# Patient Record
Sex: FEMALE | Race: WHITE | NOT HISPANIC OR LATINO | Employment: UNEMPLOYED | URBAN - NONMETROPOLITAN AREA
[De-identification: names, ages, dates, MRNs, and addresses within clinical notes are randomized per-mention and may not be internally consistent; named-entity substitution may affect disease eponyms.]

---

## 2023-01-13 ENCOUNTER — TRANSCRIBE ORDERS (OUTPATIENT)
Dept: MAMMOGRAPHY | Facility: CLINIC | Age: 58
End: 2023-01-13
Payer: COMMERCIAL

## 2023-01-13 DIAGNOSIS — R05.9 COUGH, UNSPECIFIED TYPE: Primary | ICD-10-CM

## 2023-03-06 ENCOUNTER — OFFICE VISIT (OUTPATIENT)
Dept: PULMONOLOGY | Facility: CLINIC | Age: 58
End: 2023-03-06
Payer: COMMERCIAL

## 2023-03-06 VITALS
HEIGHT: 65 IN | HEART RATE: 80 BPM | RESPIRATION RATE: 14 BRPM | WEIGHT: 132.8 LBS | DIASTOLIC BLOOD PRESSURE: 78 MMHG | TEMPERATURE: 98.6 F | OXYGEN SATURATION: 99 % | SYSTOLIC BLOOD PRESSURE: 102 MMHG | BODY MASS INDEX: 22.13 KG/M2

## 2023-03-06 DIAGNOSIS — R05.3 CHRONIC COUGH: Primary | ICD-10-CM

## 2023-03-06 PROCEDURE — 94726 PLETHYSMOGRAPHY LUNG VOLUMES: CPT | Performed by: INTERNAL MEDICINE

## 2023-03-06 PROCEDURE — 94729 DIFFUSING CAPACITY: CPT | Performed by: INTERNAL MEDICINE

## 2023-03-06 PROCEDURE — 94375 RESPIRATORY FLOW VOLUME LOOP: CPT | Performed by: INTERNAL MEDICINE

## 2023-03-06 PROCEDURE — 99204 OFFICE O/P NEW MOD 45 MIN: CPT | Performed by: INTERNAL MEDICINE

## 2023-03-06 RX ORDER — HYDROCODONE POLISTIREX AND CHLORPHENIRAMINE POLISTIREX 10; 8 MG/5ML; MG/5ML
SUSPENSION, EXTENDED RELEASE ORAL
COMMUNITY
Start: 2023-01-25 | End: 2023-03-06

## 2023-03-06 NOTE — PROGRESS NOTES
New Patient Pulmonary Office Visit      Patient Name: Adriana Martini    Referring Physician: Jose Luis Garcia MD    Chief Complaint:    Chief Complaint   Patient presents with   • Cough     8 wks post Flu       History of Present Illness: Adriana Martini is a 57 y.o. female who is here today to establish care with Pulmonary.  Patient has no significant past medical history.  Who presents to pulmonary for evaluation of a chronic cough.  Patient had the flu roughly 16 weeks ago and developed a cough at that time.  This is continued despite treatment for reflux and multiple rounds of antibiotics.  She states that she is not sure anything is helped it.  Other than the Tussionex.  States that previously Tussionex would help and she generally will get a cough 1 time a year this time after she had the flu she has not been able to break the cough.  Is nonproductive.  Denies significant drainage.  She also has no significant reflux symptoms.  She is a non-smoker.  No other acute complaints.    Review of Systems:   Review of Systems   Constitutional: Negative for chills, fatigue and fever.   HENT: Negative for congestion and voice change.    Eyes: Negative for blurred vision.   Respiratory: Positive for cough. Negative for shortness of breath and wheezing.    Cardiovascular: Negative for chest pain.   Skin: Negative for dry skin.   Hematological: Negative for adenopathy.   Psychiatric/Behavioral: Negative for agitation and depressed mood.       Past Medical History: History reviewed. No pertinent past medical history.    Past Surgical History: History reviewed. No pertinent surgical history.    Family History:   Family History   Problem Relation Age of Onset   • Cancer Father         lung       Social History:   Social History     Socioeconomic History   • Marital status:    Tobacco Use   • Smoking status: Never     Passive exposure: Never   • Smokeless tobacco: Never   Vaping Use   • Vaping Use: Never used  "  Substance and Sexual Activity   • Alcohol use: Not Currently     Comment: glass of wine very rarely   • Drug use: Never   • Sexual activity: Defer     Partners: Male       Medications:   No current outpatient medications on file.    Allergies:   No Known Allergies    Physical Exam:  Vital Signs:   Vitals:    03/06/23 1001   BP: 102/78   Pulse: 80   Resp: 14   Temp: 98.6 °F (37 °C)   TempSrc: Infrared   SpO2: 99%  Comment: RRA   Weight: 60.2 kg (132 lb 12.8 oz)   Height: 165.1 cm (65\")   PainSc: 0-No pain       Physical Exam  Vitals and nursing note reviewed.   Constitutional:       General: She is not in acute distress.     Appearance: She is well-developed and normal weight. She is not ill-appearing or toxic-appearing.   HENT:      Head: Normocephalic and atraumatic.   Cardiovascular:      Rate and Rhythm: Normal rate and regular rhythm.      Pulses: Normal pulses.      Heart sounds: Normal heart sounds. No murmur heard.    No friction rub. No gallop.   Pulmonary:      Effort: Pulmonary effort is normal. No respiratory distress.      Breath sounds: Normal breath sounds. No wheezing, rhonchi or rales.   Musculoskeletal:      Right lower leg: No edema.      Left lower leg: No edema.   Skin:     General: Skin is warm and dry.   Neurological:      Mental Status: She is alert and oriented to person, place, and time.           There is no immunization history on file for this patient.    Results Review:   - I personally reviewed the pts imaging from chest x-ray 1/13/2023 shows no acute cardiopulmonary process.  - I personally reviewed the pts PFT from 3/6/2023 shows no obstruction or restriction with a normal DLCO.  - I personally reviewed the pts chart with regards to evaluation by the patient's PCP and work-up for influenza and an 8-week history of cough    Assessment / Plan:   Diagnoses and all orders for this visit:    1. Chronic cough (Primary)  -The patient's cough has not been present since November 2022.  It is " nonproductive in nature.  I would like to go ahead and start her on therapy for asthma.  We discussed the 3 most common causes of cough are asthma, allergy, and reflux.  I have given her a Trelegy inhaler 100 mcg 1 puff once daily to utilize for the next 4 weeks see if there is any improvement in her cough.  If not we will go through reflux and postnasal drip regimen.  I am going to go ahead and get a CT scan of her chest as well given the prolonged nature of her cough to ensure we do not see any signs of MAC/fungal/autoimmune diseases that could potentially be causing the patient's symptoms.  She verbalized understanding of all this and agreed with the plan.  I have ordered a noncontrasted CT scan to be done prior to the next visit.  I will have her back in 6 weeks.      Follow Up:   Return in about 6 weeks (around 4/17/2023) for CT Chest with next visit.     JC Huerta, DO  Pulmonary and Critical Care Medicine  Note Electronically Signed    Part of this note may be an electronic transcription/translation of spoken language to printed text using the Dragon Dictation System.

## 2023-03-16 ENCOUNTER — TRANSCRIBE ORDERS (OUTPATIENT)
Dept: PULMONOLOGY | Facility: CLINIC | Age: 58
End: 2023-03-16
Payer: COMMERCIAL

## 2023-03-16 DIAGNOSIS — R05.3 CHRONIC COUGH: Primary | ICD-10-CM

## 2023-03-22 ENCOUNTER — OFFICE VISIT (OUTPATIENT)
Dept: PULMONOLOGY | Facility: CLINIC | Age: 58
End: 2023-03-22
Payer: COMMERCIAL

## 2023-03-22 VITALS
TEMPERATURE: 97.8 F | WEIGHT: 135 LBS | DIASTOLIC BLOOD PRESSURE: 70 MMHG | HEART RATE: 74 BPM | HEIGHT: 65 IN | BODY MASS INDEX: 22.49 KG/M2 | OXYGEN SATURATION: 98 % | SYSTOLIC BLOOD PRESSURE: 118 MMHG

## 2023-03-22 DIAGNOSIS — R09.82 PND (POST-NASAL DRIP): ICD-10-CM

## 2023-03-22 DIAGNOSIS — R05.3 CHRONIC COUGH: Primary | ICD-10-CM

## 2023-03-22 DIAGNOSIS — K44.9 HIATAL HERNIA: ICD-10-CM

## 2023-03-22 PROCEDURE — 99214 OFFICE O/P EST MOD 30 MIN: CPT | Performed by: INTERNAL MEDICINE

## 2023-03-22 RX ORDER — MONTELUKAST SODIUM 10 MG/1
10 TABLET ORAL NIGHTLY
Qty: 90 TABLET | Refills: 3 | Status: SHIPPED | OUTPATIENT
Start: 2023-03-22

## 2023-03-22 RX ORDER — FLUTICASONE PROPIONATE 50 MCG
2 SPRAY, SUSPENSION (ML) NASAL DAILY
Qty: 16 G | Refills: 11 | Status: SHIPPED | OUTPATIENT
Start: 2023-03-22

## 2023-03-22 NOTE — PROGRESS NOTES
"Follow Up Office Note       Patient Name: Adriana Martini    Referring Physician: No ref. provider found    Chief Complaint:    Chief Complaint   Patient presents with   • Cough   • Follow-up       History of Present Illness: Adriana Martini is a 57 y.o. female who is here today to follow-up care with Pulmonary.  Patient has no significant past medical history here to follow back up her cough.  Patient continues to have ongoing cough.  She states that she is try the Trelegy inhaler with no benefits as of yet.  She has not undergone her CT of the chest.  Cough was more of a dry cough originally and now it is becoming much more wet.  Denies any nausea, vomit, fever, or chills.  Still under quite a bit of stress with her husbands illness.    Review of Systems:   Review of Systems   Constitutional: Negative for chills, fatigue and fever.   HENT: Negative for congestion and voice change.    Eyes: Negative for blurred vision.   Respiratory: Negative for cough, shortness of breath and wheezing.    Cardiovascular: Negative for chest pain.   Skin: Negative for dry skin.   Hematological: Negative for adenopathy.   Psychiatric/Behavioral: Negative for agitation and depressed mood.       The following portions of the patient's history were reviewed and updated as appropriate: allergies, current medications, past family history, past medical history, past social history, past surgical history and problem list.    Physical Exam:  Vital Signs:   Vitals:    03/22/23 0839   BP: 118/70   Pulse: 74   Temp: 97.8 °F (36.6 °C)   SpO2: 98%  Comment: resting, room air   Weight: 61.2 kg (135 lb)   Height: 165.1 cm (65\")       Physical Exam  Vitals and nursing note reviewed.   Constitutional:       General: She is not in acute distress.     Appearance: She is well-developed and normal weight. She is not ill-appearing or toxic-appearing.   HENT:      Head: Normocephalic and atraumatic.   Cardiovascular:      Rate and Rhythm: Normal rate " and regular rhythm.      Pulses: Normal pulses.      Heart sounds: Normal heart sounds. No murmur heard.    No friction rub. No gallop.   Pulmonary:      Effort: Pulmonary effort is normal. No respiratory distress.      Breath sounds: Normal breath sounds. No wheezing, rhonchi or rales.   Musculoskeletal:      Right lower leg: No edema.      Left lower leg: No edema.   Skin:     General: Skin is warm and dry.   Neurological:      Mental Status: She is alert and oriented to person, place, and time.         Immunization History   Administered Date(s) Administered   • COVID-19 (MODERNA) 1st, 2nd, 3rd Dose Only 02/04/2021, 03/12/2021, 11/04/2021   • Hepatitis A 03/29/2019       Results Review:   -I personally reviewed the patient's CT scan of the chest, no nodules, masses, or infiltrates, no significant lymphadenopathy, there is a small hiatal hernia.   - chest x-ray 1/13/2023 shows no acute cardiopulmonary process.  - PFT from 3/6/2023 shows no obstruction or restriction with a normal DLCO.    Assessment / Plan:   Diagnoses and all orders for this visit:    1. Chronic cough (Primary)  -I reviewed the CT scan with her, and ultimately shows no signs or potential causes for the patient's cough.  We again discussed that the 3 most common causes of cough are asthma, allergy, and reflux.  We empirically tried to treat her with a Trelegy inhaler for asthma with no improvement.  We will move onto the postnasal drip regimen as noted below.    2. PND (post-nasal drip)  - I will start the patient on Flonase 2 puffs per nostril nightly x6 weeks, Singulair 10 mg nightly x6 weeks, a second generation antihistamine (loratidine, cetirizine, fexofenadine) daily x2 weeks, a decongestant (pseudoephedrine) x2 weeks, a first generation antihistamine (Benadryl) nightly x2 weeks, and Afrin (oxymetazoline) 2 to 3 sprays per nostril twice daily x48 hours.  I discussed with the patient the risk and benefits of these medications including but  not limited to urinary retention, sleep disturbances, drowsiness and rebound rhinitis.  The patient verbalized understanding.    3. Hiatal hernia  -Small hiatal hernia seen on the CT scan of the chest, this may be contributing to cough.  But she currently has no signs of reflux we will hold on empiric reflux regimen therapy at this time      Follow Up:   Return in about 2 months (around 5/22/2023).       JC Huerta,   Pulmonary and Critical Care Medicine  Note Electronically Signed    Part of this note may be an electronic transcription/translation of spoken language to printed text using the Dragon Dictation System.

## 2023-04-25 ENCOUNTER — TRANSCRIBE ORDERS (OUTPATIENT)
Dept: CT IMAGING | Facility: CLINIC | Age: 58
End: 2023-04-25
Payer: COMMERCIAL

## 2023-04-25 DIAGNOSIS — Z12.31 ENCOUNTER FOR SCREENING MAMMOGRAM FOR MALIGNANT NEOPLASM OF BREAST: Primary | ICD-10-CM

## 2023-09-11 ENCOUNTER — OFFICE VISIT (OUTPATIENT)
Dept: PULMONOLOGY | Facility: CLINIC | Age: 58
End: 2023-09-11
Payer: COMMERCIAL

## 2023-09-11 VITALS
DIASTOLIC BLOOD PRESSURE: 56 MMHG | TEMPERATURE: 97.7 F | OXYGEN SATURATION: 99 % | WEIGHT: 131.3 LBS | HEART RATE: 68 BPM | HEIGHT: 65 IN | BODY MASS INDEX: 21.88 KG/M2 | SYSTOLIC BLOOD PRESSURE: 98 MMHG

## 2023-09-11 DIAGNOSIS — R05.3 CHRONIC COUGH: Primary | ICD-10-CM

## 2023-09-11 DIAGNOSIS — K44.9 HIATAL HERNIA: ICD-10-CM

## 2023-09-11 PROCEDURE — 1160F RVW MEDS BY RX/DR IN RCRD: CPT | Performed by: NURSE PRACTITIONER

## 2023-09-11 PROCEDURE — 1159F MED LIST DOCD IN RCRD: CPT | Performed by: NURSE PRACTITIONER

## 2023-09-11 PROCEDURE — 99214 OFFICE O/P EST MOD 30 MIN: CPT | Performed by: NURSE PRACTITIONER

## 2023-09-11 RX ORDER — BENZONATATE 100 MG/1
CAPSULE ORAL
COMMUNITY
Start: 2023-07-17

## 2023-09-11 RX ORDER — HYDROCODONE POLISTIREX AND CHLORPHENIRAMINE POLISTIREX 10; 8 MG/5ML; MG/5ML
SUSPENSION, EXTENDED RELEASE ORAL
COMMUNITY
Start: 2023-07-17

## 2023-09-11 NOTE — PROGRESS NOTES
"[facility] Pulmonary Follow up      Chief Complaint  Cough (Follow up)    Subjective          Adriana Martini presents to [facility] for worsening cough.  She has had a chronic persistent cough since November when she had a viral type infection, the flu was positive but she did not have COVID-19 testing.    She was last seen by Dr. Bradley santos in the office in March for evaluation of her cough.  She was started on Trelegy empirically for cough related asthma, she says she was not able to tolerate the inhaler so stopped.  During her last visit he also started her on medication for postnasal drainage she says she did complete that therapy and is no longer on any of those medicines.  She says she did not notice any difference in her cough with postnasal drainage treatment.    She does have a small hiatal hernia but no significant reflux symptoms.  She saw ENT and did have a laryngoscopy and was started on PPI twice daily, she took it for about 2 weeks and did not notice any difference so stopped.    She is also had some pleuritic type chest pain, initially on the left.  In March there was some indication of a chronic rib fracture, she notes that she is having right-sided pleuritic pain.        Objective     Vital Signs:   BP 98/56 (BP Location: Left arm, Patient Position: Sitting, Cuff Size: Adult)   Pulse 68   Temp 97.7 °F (36.5 °C)   Ht 165.1 cm (65\")   Wt 59.6 kg (131 lb 4.8 oz)   SpO2 99% Comment: Room air at rest  BMI 21.85 kg/m²       Immunization History   Administered Date(s) Administered    COVID-19 (MODERNA) 1st,2nd,3rd Dose Monovalent 02/04/2021, 03/12/2021, 11/04/2021    Hepatitis A 03/29/2019       Physical Exam  Vitals reviewed.   Constitutional:       Appearance: She is well-developed.   HENT:      Head: Normocephalic and atraumatic.   Eyes:      Pupils: Pupils are equal, round, and reactive to light.   Cardiovascular:      Rate and Rhythm: " Normal rate and regular rhythm.      Heart sounds: No murmur heard.  Pulmonary:      Effort: Pulmonary effort is normal. No respiratory distress.      Breath sounds: Normal breath sounds. No wheezing or rales.   Abdominal:      General: Bowel sounds are normal. There is no distension.      Palpations: Abdomen is soft.   Musculoskeletal:         General: Normal range of motion.      Cervical back: Normal range of motion and neck supple.   Skin:     General: Skin is warm and dry.      Findings: No erythema.   Neurological:      Mental Status: She is alert and oriented to person, place, and time.   Psychiatric:         Behavior: Behavior normal.        Result Review :                         Assessment and Plan    Problem List Items Addressed This Visit          Gastrointestinal Abdominal     Hiatal hernia       Pulmonary and Pneumonias    Chronic cough - Primary       We went over her CT scans as well as her prior PFTs.  Her CT scan is normal pulmonary function test are normal.    She said she did try the postnasal drainage regimen without any benefit.  She is no longer taking any of those medicines.    She did see ENT which sounds like he thought was a bit of reflux he started on a PPI which she took for maybe a couple weeks and stopped.  I did encourage her to resume her PPI as well as reflux precautions including elevating the head of her bed at night.  I also recommend she follow-up with Dr. Chen, her ENT in [place], to see if there should be an evaluation for vocal cord dysfunction since this is just a dry hacking cough.  She says she also had labs done for evaluation of allergies which were negative.    She was wondering if she needed a scope for further evaluation.  She can follow-up with ear nose and throat for any further recommendations and then call me back if she like to proceed with a bronchoscopy.  I did speak with Dr. Huerta and he would be agreeable for a bronchoscopy for airway evaluation if  needed.      Follow Up     No follow-ups on file.  Patient was given instructions and counseling regarding her condition or for health maintenance advice. Please see specific information pulled into the AVS if appropriate.     I spent 35 minutes caring for Adriana on this date of service. This time includes time spent by me in the following activities:preparing for the visit, reviewing tests, obtaining and/or reviewing a separately obtained history, performing a medically appropriate examination and/or evaluation , counseling and educating the patient/family/caregiver, ordering medications, tests, or procedures, referring and communicating with other health care professionals , documenting information in the medical record, and independently interpreting results and communicating that information with the patient/family/caregiver    TANVI Ramires, ACNP  [facility] Pulmonary Critical Care Medicine  Electronically signed

## 2024-06-12 ENCOUNTER — OFFICE VISIT (OUTPATIENT)
Dept: FAMILY MEDICINE CLINIC | Facility: CLINIC | Age: 59
End: 2024-06-12
Payer: COMMERCIAL

## 2024-06-12 VITALS
RESPIRATION RATE: 16 BRPM | HEART RATE: 86 BPM | HEIGHT: 65 IN | OXYGEN SATURATION: 98 % | SYSTOLIC BLOOD PRESSURE: 138 MMHG | BODY MASS INDEX: 22.79 KG/M2 | DIASTOLIC BLOOD PRESSURE: 80 MMHG | WEIGHT: 136.8 LBS

## 2024-06-12 DIAGNOSIS — Z00.00 ANNUAL PHYSICAL EXAM: ICD-10-CM

## 2024-06-12 DIAGNOSIS — R05.3 CHRONIC COUGH: Primary | ICD-10-CM

## 2024-06-12 DIAGNOSIS — K44.9 HIATAL HERNIA: ICD-10-CM

## 2024-06-12 DIAGNOSIS — Z11.59 NEED FOR HEPATITIS C SCREENING TEST: ICD-10-CM

## 2024-06-12 PROCEDURE — 2014F MENTAL STATUS ASSESS: CPT

## 2024-06-12 PROCEDURE — 1126F AMNT PAIN NOTED NONE PRSNT: CPT

## 2024-06-12 PROCEDURE — 1159F MED LIST DOCD IN RCRD: CPT

## 2024-06-12 PROCEDURE — 1160F RVW MEDS BY RX/DR IN RCRD: CPT

## 2024-06-12 PROCEDURE — 36415 COLL VENOUS BLD VENIPUNCTURE: CPT

## 2024-06-12 PROCEDURE — 99396 PREV VISIT EST AGE 40-64: CPT

## 2024-06-12 RX ORDER — PANTOPRAZOLE SODIUM 40 MG/1
40 TABLET, DELAYED RELEASE ORAL DAILY
COMMUNITY

## 2024-06-12 RX ORDER — LORATADINE 10 MG/1
10 TABLET ORAL DAILY
Qty: 30 TABLET | Refills: 4 | Status: SHIPPED | OUTPATIENT
Start: 2024-06-12

## 2024-06-12 NOTE — PROGRESS NOTES
New Patient Office Visit      Date: 24   Patient Name: Adriana Martini  : 1965   MRN: [id number]     Chief Complaint:    Chief Complaint   Patient presents with    Cough     Patient has had the cough for a year and a half. Gyno stated she needs to see an allergy specialist. Patient states that she has seen ENT, pulmonology, and has had several tests done that clinton not show anything. States it is a dry cough.        History of Present Illness: Adriana aMrtini is a 58 y.o. female who is here today for a get acquainted visit to establish care with a new provider. I have reviewed the patient's medical history and updated the computerized patient record. Baseline screening tests were discussed today and pt agrees to discuss health maintenance and goals in future appointments.    Has seen Dr. Chen who sent her to Dr. Tavares who put her on reflux medication  (GI and ENT)    HPI Notes:  Cough  This is a chronic problem. The current episode started more than 1 year ago. The problem has been unchanged. The problem occurs every few minutes. The cough is Dry. Associated symptoms include postnasal drip. Pertinent negatives include no chest pain, ear pain, fever, headaches, heartburn, hemoptysis, sore throat, shortness of breath or weight loss. She has tried body position changes, prescription cough suppressant and OTC cough suppressant for the symptoms. The treatment provided no relief. Her past medical history is significant for environmental allergies.        Subjective     History reviewed. No pertinent past medical history.    History reviewed. No pertinent surgical history.    Family History   Problem Relation Age of Onset    Cancer Father         lung       Social History     Socioeconomic History    Marital status:    Tobacco Use    Smoking status: Never     Passive exposure: Never    Smokeless tobacco: Never   Vaping Use    Vaping status: Never Used   Substance and Sexual Activity    Alcohol  "use: Not Currently     Comment: glass of wine very rarely    Drug use: Never    Sexual activity: Defer     Partners: Male         Current Outpatient Medications:     benzonatate (TESSALON) 100 MG capsule, , Disp: , Rfl:     fluticasone (FLONASE) 50 MCG/ACT nasal spray, 2 sprays into the nostril(s) as directed by provider Daily., Disp: 16 g, Rfl: 11    pantoprazole (PROTONIX) 40 MG EC tablet, Take 1 tablet by mouth Daily., Disp: , Rfl:     loratadine (Claritin) 10 MG tablet, Take 1 tablet by mouth Daily., Disp: 30 tablet, Rfl: 4      No Known Allergies    Objective     Vitals:    06/12/24 1305   BP: 138/80   BP Location: Left arm   Patient Position: Sitting   Cuff Size: Adult   Pulse: 86   Resp: 16   SpO2: 98%   Weight: 62.1 kg (136 lb 12.8 oz)   Height: 165.1 cm (65\")   PainSc: 0-No pain          Physical Exam  Vitals and nursing note reviewed.   Constitutional:       General: She is not in acute distress.     Appearance: She is not toxic-appearing.   HENT:      Head: Normocephalic and atraumatic.      Right Ear: External ear normal. There is impacted cerumen.      Left Ear: External ear normal. There is impacted cerumen.      Nose: Nose normal.      Mouth/Throat:      Mouth: Mucous membranes are moist.      Pharynx: No pharyngeal swelling, oropharyngeal exudate or uvula swelling. Posterior oropharyngeal erythema: Mild.     Tonsils: No tonsillar exudate or tonsillar abscesses.   Eyes:      Extraocular Movements: Extraocular movements intact.      Conjunctiva/sclera: Conjunctivae normal.      Pupils: Pupils are equal, round, and reactive to light.   Cardiovascular:      Rate and Rhythm: Normal rate and regular rhythm.      Pulses: Normal pulses.      Heart sounds: Normal heart sounds.   Pulmonary:      Effort: Pulmonary effort is normal. No respiratory distress.      Breath sounds: Normal breath sounds.   Abdominal:      General: Bowel sounds are normal. There is no distension.      Palpations: Abdomen is soft.      " Tenderness: There is no abdominal tenderness.   Musculoskeletal:         General: Normal range of motion.      Cervical back: Normal range of motion and neck supple. No tenderness.      Right lower leg: No edema.      Left lower leg: No edema.   Lymphadenopathy:      Cervical: No cervical adenopathy.   Skin:     General: Skin is warm and dry.      Capillary Refill: Capillary refill takes less than 2 seconds.   Neurological:      General: No focal deficit present.      Mental Status: She is alert and oriented to person, place, and time.      Motor: No weakness.      Gait: Gait normal.   Psychiatric:         Mood and Affect: Mood normal.         Behavior: Behavior normal.         Thought Content: Thought content does not include suicidal ideation.       PHQ-9 Total Score: 0     Assessment / Plan      Diagnoses and all orders for this visit:    1. Chronic cough (Primary)  Assessment & Plan:  Patient reports she developed a dry cough over a year ago after having COVID.  Patient reports she has been evaluated and had multiple tests but no explanation for resolution of cough.  Patient has been seen by Dr. Chen, ENT as well as ED, Dr. Tavares.  She does have some issues with seasonal allergies and evidence of PND today.  Patient agrees to lab evaluation today and return in 2 weeks.  During that 2 weeks patient will take Claritin and fluticasone nasal spray and report back if this decreases PND.  Patient agrees to follow-up in 2 weeks unless otherwise indicated. We will re-evaluate at that time.    Orders:  -     loratadine (Claritin) 10 MG tablet; Take 1 tablet by mouth Daily.  Dispense: 30 tablet; Refill: 4    2. Hiatal hernia  Assessment & Plan:  Patient seen by Dr. Chaitanya WARD who put her on PPI medication.  Discussed his assessment that she had a small hiatal hernia, patient was unaware of this diagnosis.    Patient stopped taking PPI medication because she states she did not feel like she had heartburn.  We discussed  hiatal hernia and that it is possible she could have reflux at night without being conscious of it which could contribute to cough.  She was encouraged to take PPI medication regularly along with seasonal allergy medications and see if this will improve her cough.      3. Need for hepatitis C screening test  -     Hepatitis C Antibody    4. Annual physical exam  Assessment & Plan:  New patient presents today for annual wellness exam with chief complaint of chronic cough  Available medical records reviewed today with the patient and appropriate diagnostic testing ordered.  Current medication refills provided per patient request.  Patient education materials attached to AVS related to health maintenance recommendations and healthy lifestyle. Materials were reviewed with patient during their office visit today and all questions addressed.  Patient agrees to follow-up in 2-4 weeks to discuss diagnostic testing, address health goals,  and address care gaps.    Orders:  -     CBC Auto Differential  -     Comprehensive Metabolic Panel  -     Hemoglobin A1c  -     Lipid Panel  -     TSH Rfx On Abnormal To Free T4      Body mass index is 22.76 kg/m².  BMI is within normal parameters. No other follow-up for BMI required.      Patient education materials attached to AVS - Health Maintenance Recommendations. Materials were reviewed with patient during their office visit today and all questions addressed.    Return in about 2 weeks (around 6/26/2024) for Recheck.    TANVI Avila ([facility]  [address] [id number] [phone]    NOTE TO PATIENT: The 21st Century Cures Act makes medical notes like these available to patients in the interest of transparency. However, be advised this is a medical document. It is intended as peer to peer communication. It is written in medical language and may contain abbreviations or verbiage that are unfamiliar.  It may appear blunt or direct. Medical documents are intended to carry relevant information, facts as evident, and the clinical opinion of the practitioner.      EMR Dragon/Transcription disclaimer:  Much of this encounter note is an electronic transcription of spoken language to printed text. Electronic transcription of spoken language may permit erroneous, or at times, nonsensical words or phrases to be inadvertently transcribed. Although I have reviewed the note for such errors, some may still exist.

## 2024-06-13 LAB
ALBUMIN SERPL-MCNC: 4.2 G/DL (ref 3.8–4.9)
ALBUMIN/GLOB SERPL: 2.1 {RATIO}
ALP SERPL-CCNC: 70 IU/L (ref 44–121)
ALT SERPL-CCNC: 10 IU/L (ref 0–32)
AST SERPL-CCNC: 20 IU/L (ref 0–40)
BASOPHILS # BLD AUTO: 0 X10E3/UL (ref 0–0.2)
BASOPHILS NFR BLD AUTO: 1 %
BILIRUB SERPL-MCNC: 0.2 MG/DL (ref 0–1.2)
BUN SERPL-MCNC: 16 MG/DL (ref 6–24)
BUN/CREAT SERPL: 19 (ref 9–23)
CALCIUM SERPL-MCNC: 9.3 MG/DL (ref 8.7–10.2)
CHLORIDE SERPL-SCNC: 105 MMOL/L (ref 96–106)
CHOLEST SERPL-MCNC: 211 MG/DL (ref 100–199)
CO2 SERPL-SCNC: 25 MMOL/L (ref 20–29)
CREAT SERPL-MCNC: 0.85 MG/DL (ref 0.57–1)
EGFRCR SERPLBLD CKD-EPI 2021: 80 ML/MIN/1.73
EOSINOPHIL # BLD AUTO: 0.2 X10E3/UL (ref 0–0.4)
EOSINOPHIL NFR BLD AUTO: 3 %
ERYTHROCYTE [DISTWIDTH] IN BLOOD BY AUTOMATED COUNT: 12.7 % (ref 11.7–15.4)
GLOBULIN SER CALC-MCNC: 2 G/DL (ref 1.5–4.5)
GLUCOSE SERPL-MCNC: 87 MG/DL (ref 70–99)
HBA1C MFR BLD: 5.3 % (ref 4.8–5.6)
HCT VFR BLD AUTO: 37.6 % (ref 34–46.6)
HCV IGG SERPL QL IA: NON REACTIVE
HDLC SERPL-MCNC: 51 MG/DL
HGB BLD-MCNC: 12.4 G/DL (ref 11.1–15.9)
IMM GRANULOCYTES # BLD AUTO: 0 X10E3/UL (ref 0–0.1)
IMM GRANULOCYTES NFR BLD AUTO: 0 %
LDLC SERPL CALC-MCNC: 137 MG/DL (ref 0–99)
LYMPHOCYTES # BLD AUTO: 1.5 X10E3/UL (ref 0.7–3.1)
LYMPHOCYTES NFR BLD AUTO: 33 %
MCH RBC QN AUTO: 30.3 PG (ref 26.6–33)
MCHC RBC AUTO-ENTMCNC: 33 G/DL (ref 31.5–35.7)
MCV RBC AUTO: 92 FL (ref 79–97)
MONOCYTES # BLD AUTO: 0.4 X10E3/UL (ref 0.1–0.9)
MONOCYTES NFR BLD AUTO: 8 %
NEUTROPHILS # BLD AUTO: 2.6 X10E3/UL (ref 1.4–7)
NEUTROPHILS NFR BLD AUTO: 55 %
PLATELET # BLD AUTO: 245 X10E3/UL (ref 150–450)
POTASSIUM SERPL-SCNC: 3.9 MMOL/L (ref 3.5–5.2)
PROT SERPL-MCNC: 6.2 G/DL (ref 6–8.5)
RBC # BLD AUTO: 4.09 X10E6/UL (ref 3.77–5.28)
SODIUM SERPL-SCNC: 143 MMOL/L (ref 134–144)
TRIGL SERPL-MCNC: 130 MG/DL (ref 0–149)
TSH SERPL DL<=0.005 MIU/L-ACNC: 1.26 UIU/ML (ref 0.45–4.5)
VLDLC SERPL CALC-MCNC: 23 MG/DL (ref 5–40)
WBC # BLD AUTO: 4.7 X10E3/UL (ref 3.4–10.8)

## 2024-06-13 NOTE — ASSESSMENT & PLAN NOTE
New patient presents today for annual wellness exam with chief complaint of chronic cough  Available medical records reviewed today with the patient and appropriate diagnostic testing ordered.  Current medication refills provided per patient request.  Patient education materials attached to AVS related to health maintenance recommendations and healthy lifestyle. Materials were reviewed with patient during their office visit today and all questions addressed.  Patient agrees to follow-up in 2-4 weeks to discuss diagnostic testing, address health goals,  and address care gaps.

## 2024-06-13 NOTE — ASSESSMENT & PLAN NOTE
Patient reports she developed a dry cough over a year ago after having COVID.  Patient reports she has been evaluated and had multiple tests but no explanation for resolution of cough.  Patient has been seen by Dr. Chen, ENT as well as GI, Dr. Tavares.  She does have some issues with seasonal allergies and evidence of PND today.  Patient agrees to lab evaluation today and return in 2 weeks.  During that 2 weeks patient will take Claritin and fluticasone nasal spray and report back if this decreases PND.  Patient agrees to follow-up in 2 weeks unless otherwise indicated. We will re-evaluate at that time.

## 2024-06-13 NOTE — ASSESSMENT & PLAN NOTE
Patient seen by GI, Dr. Tavares who put her on PPI medication.  Discussed his assessment that she had a small hiatal hernia, patient was unaware of this diagnosis.    Patient stopped taking PPI medication because she states she did not feel like she had heartburn.  We discussed hiatal hernia and that it is possible she could have reflux at night without being conscious of it which could contribute to cough.  She was encouraged to take PPI medication regularly along with seasonal allergy medications and see if this will improve her cough.

## 2024-06-24 DIAGNOSIS — R05.3 CHRONIC COUGH: ICD-10-CM

## 2024-06-24 RX ORDER — FLUTICASONE PROPIONATE 50 MCG
2 SPRAY, SUSPENSION (ML) NASAL DAILY
Qty: 16 ML | Refills: 2 | Status: SHIPPED | OUTPATIENT
Start: 2024-06-24

## 2024-08-01 ENCOUNTER — OFFICE VISIT (OUTPATIENT)
Dept: FAMILY MEDICINE CLINIC | Facility: CLINIC | Age: 59
End: 2024-08-01
Payer: COMMERCIAL

## 2024-08-01 VITALS
HEIGHT: 65 IN | HEART RATE: 86 BPM | SYSTOLIC BLOOD PRESSURE: 114 MMHG | RESPIRATION RATE: 16 BRPM | OXYGEN SATURATION: 98 % | BODY MASS INDEX: 21.98 KG/M2 | WEIGHT: 131.9 LBS | DIASTOLIC BLOOD PRESSURE: 86 MMHG

## 2024-08-01 DIAGNOSIS — R05.3 PERSISTENT DRY COUGH: Primary | ICD-10-CM

## 2024-08-01 DIAGNOSIS — R05.3 CHRONIC COUGH: ICD-10-CM

## 2024-08-01 PROCEDURE — 99213 OFFICE O/P EST LOW 20 MIN: CPT

## 2024-08-01 PROCEDURE — 1160F RVW MEDS BY RX/DR IN RCRD: CPT

## 2024-08-01 PROCEDURE — 1159F MED LIST DOCD IN RCRD: CPT

## 2024-08-01 PROCEDURE — 1126F AMNT PAIN NOTED NONE PRSNT: CPT

## 2024-08-01 RX ORDER — VALACYCLOVIR HYDROCHLORIDE 500 MG/1
500 TABLET, FILM COATED ORAL 2 TIMES DAILY
COMMUNITY
Start: 2024-07-05

## 2024-08-01 RX ORDER — FLUTICASONE PROPIONATE 50 MCG
2 SPRAY, SUSPENSION (ML) NASAL DAILY
Qty: 16 ML | Refills: 2 | Status: SHIPPED | OUTPATIENT
Start: 2024-08-01

## 2024-08-01 RX ORDER — LORATADINE 10 MG/1
10 TABLET ORAL DAILY
Qty: 30 TABLET | Refills: 4 | Status: SHIPPED | OUTPATIENT
Start: 2024-08-01

## 2024-08-01 RX ORDER — PANTOPRAZOLE SODIUM 40 MG/1
40 TABLET, DELAYED RELEASE ORAL DAILY
Qty: 30 TABLET | Refills: 2 | Status: SHIPPED | OUTPATIENT
Start: 2024-08-01

## 2024-08-01 RX ORDER — DEXTROMETHORPHAN HYDROBROMIDE AND PROMETHAZINE HYDROCHLORIDE 15; 6.25 MG/5ML; MG/5ML
5 SYRUP ORAL NIGHTLY PRN
Qty: 120 ML | Refills: 1 | Status: SHIPPED | OUTPATIENT
Start: 2024-08-01

## 2024-08-01 RX ORDER — BENZONATATE 100 MG/1
100 CAPSULE ORAL 3 TIMES DAILY PRN
Qty: 90 CAPSULE | Refills: 2 | Status: SHIPPED | OUTPATIENT
Start: 2024-08-01

## 2024-08-01 NOTE — PROGRESS NOTES
Follow Up Office Visit      Date:  2024   Patient Name: Adriana Martini  : 1965   MRN: [id number]     Chief Complaint:    Chief Complaint   Patient presents with   • Cough     Follow up. Patient states it is the same.    • Results     Follow up on labs.        History of Present Illness: Adriana Martini is a 58 y.o. female who is here today for follow up on cough and lab results.     Subjective      History reviewed. No pertinent past medical history.    History reviewed. No pertinent surgical history.    Family History   Problem Relation Age of Onset   • Cancer Father         lung       Social History     Socioeconomic History   • Marital status:    Tobacco Use   • Smoking status: Never     Passive exposure: Never   • Smokeless tobacco: Never   Vaping Use   • Vaping status: Never Used   Substance and Sexual Activity   • Alcohol use: Not Currently     Comment: glass of wine very rarely   • Drug use: Never   • Sexual activity: Defer     Partners: Male         Current Outpatient Medications:   •  benzonatate (TESSALON) 100 MG capsule, Take 1 capsule by mouth 3 (Three) Times a Day As Needed for Cough., Disp: 90 capsule, Rfl: 2  •  fluticasone (FLONASE) 50 MCG/ACT nasal spray, 2 sprays by Each Nare route Daily., Disp: 16 mL, Rfl: 2  •  loratadine (Claritin) 10 MG tablet, Take 1 tablet by mouth Daily., Disp: 30 tablet, Rfl: 4  •  pantoprazole (PROTONIX) 40 MG EC tablet, Take 1 tablet by mouth Daily., Disp: 30 tablet, Rfl: 2  •  valACYclovir (VALTREX) 500 MG tablet, Take 1 tablet by mouth 2 (Two) Times a Day., Disp: , Rfl:   •  promethazine-dextromethorphan (PROMETHAZINE-DM) 6.25-15 MG/5ML syrup, Take 5 mL by mouth At Night As Needed for Cough., Disp: 120 mL, Rfl: 1    No Known Allergies    Objective     Physical Exam  Vitals and nursing note reviewed.   Constitutional:       Appearance: Normal appearance.   Eyes:      Pupils: Pupils are equal, round, and reactive to light.   Cardiovascular:  "     Rate and Rhythm: Normal rate and regular rhythm.   Pulmonary:      Effort: Pulmonary effort is normal.   Neurological:      Mental Status: She is alert and oriented to person, place, and time.       Vitals:    08/01/24 1348   BP: 114/86   BP Location: Left arm   Patient Position: Sitting   Cuff Size: Adult   Pulse: 86   Resp: 16   SpO2: 98%   Weight: 59.8 kg (131 lb 14.4 oz)   Height: 165.1 cm (65\")   PainSc: 0-No pain     Body mass index is 21.95 kg/m².          The 10-year ASCVD risk score (Tamika EDGE, et al., 2019) is: 2.2%    Values used to calculate the score:      Age: 58 years      Sex: Female      Is Non- : No      Diabetic: No      Tobacco smoker: No      Systolic Blood Pressure: 114 mmHg      Is BP treated: No      HDL Cholesterol: 51 mg/dL      Total Cholesterol: 211 mg/dL      Assessment / Plan      Diagnoses and all orders for this visit:    1. Persistent dry cough (Primary)  -     benzonatate (TESSALON) 100 MG capsule; Take 1 capsule by mouth 3 (Three) Times a Day As Needed for Cough.  Dispense: 90 capsule; Refill: 2  -     promethazine-dextromethorphan (PROMETHAZINE-DM) 6.25-15 MG/5ML syrup; Take 5 mL by mouth At Night As Needed for Cough.  Dispense: 120 mL; Refill: 1    2. Chronic cough  Assessment & Plan:  Continues to have persistent dry cough greater than 1 year  History of ENT evaluation, Dr. Chen  History of GI evaluation, Dr. Tavares  Some ongoing issues with PND related to seasonal allergies but so far no explanation to explain chronic cough  She has been consistently taking Claritin and fluticasone, and pantoprazole with no improvement in cough  No change in treatment regimen at this time, medication refills provided per patient request.  Patient agrees to referral back to Dr. Chen who originally sent her to GI    Orders:  -     loratadine (Claritin) 10 MG tablet; Take 1 tablet by mouth Daily.  Dispense: 30 tablet; Refill: 4  -     fluticasone (FLONASE) 50 MCG/ACT " nasal spray; 2 sprays by Each Nare route Daily.  Dispense: 16 mL; Refill: 2  -     pantoprazole (PROTONIX) 40 MG EC tablet; Take 1 tablet by mouth Daily.  Dispense: 30 tablet; Refill: 2  -     promethazine-dextromethorphan (PROMETHAZINE-DM) 6.25-15 MG/5ML syrup; Take 5 mL by mouth At Night As Needed for Cough.  Dispense: 120 mL; Refill: 1       BMI Follow up:  BMI is within normal parameters. No other follow-up for BMI required.    Most recent lab results were reviewed with the patient, essentially negative except for elevated cholesterol and LDL.  We discussed lifestyle changes to improve these numbers.  Patient to follow-up for annual wellness exam unless otherwise indicated.    Patient Education:   Reviewed medications, potential side effects and signs and symptoms to report.   Discussed risk versus benefits of treatment plan with patient and/or family-including medications, labs and radiology that may be ordered.    Patient education materials attached to AVS related to health maintenance and these materials reviewed with patient during office visit today.   Addressed questions and concerns during visit. Patient and/or family verbalized understanding and agree with plan.   Instructed to call the office with any questions and report to ER with any life-threatening symptoms.     Return in about 1 year (around 8/1/2025) for Annual physical.    TANVI Avila ([place]) [facility]  [address] [facility] [id number] [phone]    NOTE TO PATIENT: The 21st Century Cures Act makes medical notes like these available to patients in the interest of transparency. However, be advised this is a medical document. It is intended as peer to peer communication. It is written in medical language and may contain abbreviations or verbiage that are unfamiliar. It may appear blunt or direct. Medical documents are intended to carry relevant information, facts as evident,  and the clinical opinion of the practitioner.     EMR Dragon/Transcription disclaimer:  Much of this encounter note is an electronic transcription of spoken language to printed text. Electronic transcription of spoken language may permit erroneous, or at times, nonsensical words or phrases to be inadvertently transcribed. Although I have reviewed the note for such errors, some may still exist.

## 2024-08-01 NOTE — PATIENT INSTRUCTIONS
Health Maintenance, Female  Adopting a healthy lifestyle and getting preventive care can go a long way to promote health and wellness. Talk with your health care provider about what schedule of regular examinations is right for you. This is a good chance for you to check in with your provider about disease prevention and staying healthy.  In between checkups, there are plenty of things you can do on your own. Experts have done a lot of research about which lifestyle changes and preventive measures are most likely to keep you healthy. Ask your health care provider for more information.  Weight and diet  Eat a healthy diet  Be sure to include plenty of vegetables, fruits, low-fat dairy products, and lean protein.  Do not eat a lot of foods high in solid fats, added sugars, or salt.  Get regular exercise. This is one of the most important things you can do for your health.  Most adults should exercise for at least 150 minutes each week. The exercise should increase your heart rate and make you sweat (moderate-intensity exercise).  Most adults should also do strengthening exercises at least twice a week. This is in addition to the moderate-intensity exercise.     Maintain a healthy weight  Body mass index (BMI) is a measurement that can be used to identify possible weight problems. It estimates body fat based on height and weight. Your health care provider can help determine your BMI and help you achieve or maintain a healthy weight.  For females 20 years of age and older:  A BMI below 18.5 is considered underweight.  A BMI of 18.5 to 24.9 is normal.  A BMI of 25 to 29.9 is considered overweight.  A BMI of 30 and above is considered obese.     Watch levels of cholesterol and blood lipids  You should start having your blood tested for lipids and cholesterol at 20 years of age, then have this test every 5 years.  You may need to have your cholesterol levels checked more often if:  Your lipid or cholesterol levels are  high.  You are older than 50 years of age.  You are at high risk for heart disease.     Cancer screening  Lung Cancer  Lung cancer screening is recommended for adults 55-80 years old who are at high risk for lung cancer because of a history of smoking.  A yearly low-dose CT scan of the lungs is recommended for people who:  Currently smoke.  Have quit within the past 15 years.  Have at least a 30-pack-year history of smoking. A pack year is smoking an average of one pack of cigarettes a day for 1 year.  Yearly screening should continue until it has been 15 years since you quit.  Yearly screening should stop if you develop a health problem that would prevent you from having lung cancer treatment.     Breast Cancer  Practice breast self-awareness. This means understanding how your breasts normally appear and feel.  It also means doing regular breast self-exams. Let your health care provider know about any changes, no matter how small.  If you are in your 20s or 30s, you should have a clinical breast exam (CBE) by a health care provider every 1-3 years as part of a regular health exam.  If you are 40 or older, have a CBE every year. Also consider having a breast X-ray (mammogram) every year.  If you have a family history of breast cancer, talk to your health care provider about genetic screening.  If you are at high risk for breast cancer, talk to your health care provider about having an MRI and a mammogram every year.  Breast cancer gene (BRCA) assessment is recommended for women who have family members with BRCA-related cancers. BRCA-related cancers include:  Breast.  Ovarian.  Tubal.  Peritoneal cancers.  Results of the assessment will determine the need for genetic counseling and BRCA1 and BRCA2 testing.     Cervical Cancer  Your health care provider may recommend that you be screened regularly for cancer of the pelvic organs (ovaries, uterus, and vagina). This screening involves a pelvic examination, including  checking for microscopic changes to the surface of your cervix (Pap test). You may be encouraged to have this screening done every 3 years, beginning at age 21.  For women ages 30-65, health care providers may recommend pelvic exams and Pap testing every 3 years, or they may recommend the Pap and pelvic exam, combined with testing for human papilloma virus (HPV), every 5 years. Some types of HPV increase your risk of cervical cancer. Testing for HPV may also be done on women of any age with unclear Pap test results.  Other health care providers may not recommend any screening for nonpregnant women who are considered low risk for pelvic cancer and who do not have symptoms. Ask your health care provider if a screening pelvic exam is right for you.  If you have had past treatment for cervical cancer or a condition that could lead to cancer, you need Pap tests and screening for cancer for at least 20 years after your treatment. If Pap tests have been discontinued, your risk factors (such as having a new sexual partner) need to be reassessed to determine if screening should resume. Some women have medical problems that increase the chance of getting cervical cancer. In these cases, your health care provider may recommend more frequent screening and Pap tests.     Colorectal Cancer  This type of cancer can be detected and often prevented.  Routine colorectal cancer screening usually begins at 50 years of age and continues through 75 years of age.  Your health care provider may recommend screening at an earlier age if you have risk factors for colon cancer.  Your health care provider may also recommend using home test kits to check for hidden blood in the stool.  A small camera at the end of a tube can be used to examine your colon directly (sigmoidoscopy or colonoscopy). This is done to check for the earliest forms of colorectal cancer.  Routine screening usually begins at age 50.  Direct examination of the colon should  be repeated every 5-10 years through 75 years of age. However, you may need to be screened more often if early forms of precancerous polyps or small growths are found.     Skin Cancer  Check your skin from head to toe regularly.  Tell your health care provider about any new moles or changes in moles, especially if there is a change in a mole's shape or color.  Also tell your health care provider if you have a mole that is larger than the size of a pencil eraser.  Always use sunscreen. Apply sunscreen liberally and repeatedly throughout the day.  Protect yourself by wearing long sleeves, pants, a wide-brimmed hat, and sunglasses whenever you are outside.     Heart disease, diabetes, and high blood pressure  High blood pressure causes heart disease and increases the risk of stroke. High blood pressure is more likely to develop in:  People who have blood pressure in the high end of the normal range (130-139/85-89 mm Hg).  People who are overweight or obese.  People who are .  If you are 18-39 years of age, have your blood pressure checked every 3-5 years. If you are 40 years of age or older, have your blood pressure checked every year. You should have your blood pressure measured twice--once when you are at a hospital or clinic, and once when you are not at a hospital or clinic. Record the average of the two measurements. To check your blood pressure when you are not at a hospital or clinic, you can use:  An automated blood pressure machine at a pharmacy.  A home blood pressure monitor.  If you are between 55 years and 79 years old, ask your health care provider if you should take aspirin to prevent strokes.  Have regular diabetes screenings. This involves taking a blood sample to check your fasting blood sugar level.  If you are at a normal weight and have a low risk for diabetes, have this test once every three years after 45 years of age.  If you are overweight and have a high risk for diabetes,  consider being tested at a younger age or more often.  Preventing infection  Hepatitis B  If you have a higher risk for hepatitis B, you should be screened for this virus. You are considered at high risk for hepatitis B if:  You were born in a country where hepatitis B is common. Ask your health care provider which countries are considered high risk.  Your parents were born in a high-risk country, and you have not been immunized against hepatitis B (hepatitis B vaccine).  You have HIV or AIDS.  You use needles to inject street drugs.  You live with someone who has hepatitis B.  You have had sex with someone who has hepatitis B.  You get hemodialysis treatment.  You take certain medicines for conditions, including cancer, organ transplantation, and autoimmune conditions.     Hepatitis C  Blood testing is recommended for:  Everyone born from 1945 through 1965.  Anyone with known risk factors for hepatitis C.     Sexually transmitted infections (STIs)  You should be screened for sexually transmitted infections (STIs) including gonorrhea and chlamydia if:  You are sexually active and are younger than 24 years of age.  You are older than 24 years of age and your health care provider tells you that you are at risk for this type of infection.  Your sexual activity has changed since you were last screened and you are at an increased risk for chlamydia or gonorrhea. Ask your health care provider if you are at risk.  If you do not have HIV, but are at risk, it may be recommended that you take a prescription medicine daily to prevent HIV infection. This is called pre-exposure prophylaxis (PrEP). You are considered at risk if:  You are sexually active and do not regularly use condoms or know the HIV status of your partner(s).  You take drugs by injection.  You are sexually active with a partner who has HIV.     Talk with your health care provider about whether you are at high risk of being infected with HIV. If you choose to  begin PrEP, you should first be tested for HIV. You should then be tested every 3 months for as long as you are taking PrEP.  Pregnancy  If you are premenopausal and you may become pregnant, ask your health care provider about preconception counseling.  If you may become pregnant, take 400 to 800 micrograms (mcg) of folic acid every day.  If you want to prevent pregnancy, talk to your health care provider about birth control (contraception).  Osteoporosis and menopause  Osteoporosis is a disease in which the bones lose minerals and strength with aging. This can result in serious bone fractures. Your risk for osteoporosis can be identified using a bone density scan.  If you are 65 years of age or older, or if you are at risk for osteoporosis and fractures, ask your health care provider if you should be screened.  Ask your health care provider whether you should take a calcium or vitamin D supplement to lower your risk for osteoporosis.  Menopause may have certain physical symptoms and risks.  Hormone replacement therapy may reduce some of these symptoms and risks.  Talk to your health care provider about whether hormone replacement therapy is right for you.  Follow these instructions at home:  Schedule regular health, dental, and eye exams.  Stay current with your immunizations.  Do not use any tobacco products including cigarettes, chewing tobacco, or electronic cigarettes.  If you are pregnant, do not drink alcohol.  If you are breastfeeding, limit how much and how often you drink alcohol.  Limit alcohol intake to no more than 1 drink per day for nonpregnant women. One drink equals 12 ounces of beer, 5 ounces of wine, or 1½ ounces of hard liquor.  Do not use street drugs.  Do not share needles.  Ask your health care provider for help if you need support or information about quitting drugs.  Tell your health care provider if you often feel depressed.  Tell your health care provider if you have ever been abused or do  not feel safe at home.  This information is not intended to replace advice given to you by your health care provider. Make sure you discuss any questions you have with your health care provider.  Document Released: 07/02/2012 Document Revised: 05/25/2017 Document Reviewed: 09/20/2016  [employer] Interactive Patient Education © 2018 [employer]

## 2024-08-19 NOTE — ASSESSMENT & PLAN NOTE
Continues to have persistent dry cough greater than 1 year  History of ENT evaluation, Dr. Chen  History of GI evaluation, Dr. Tavaers  Some ongoing issues with PND related to seasonal allergies but so far no explanation to explain chronic cough  She has been consistently taking Claritin and fluticasone, and pantoprazole with no improvement in cough  No change in treatment regimen at this time, medication refills provided per patient request.  Patient agrees to referral back to Dr. Chen who originally sent her to GI

## 2024-09-17 ENCOUNTER — OFFICE VISIT (OUTPATIENT)
Dept: FAMILY MEDICINE CLINIC | Facility: CLINIC | Age: 59
End: 2024-09-17
Payer: COMMERCIAL

## 2024-09-17 VITALS
SYSTOLIC BLOOD PRESSURE: 118 MMHG | DIASTOLIC BLOOD PRESSURE: 70 MMHG | WEIGHT: 130 LBS | HEIGHT: 65 IN | BODY MASS INDEX: 21.66 KG/M2 | HEART RATE: 77 BPM | OXYGEN SATURATION: 97 %

## 2024-09-17 DIAGNOSIS — L65.9 HAIR THINNING: ICD-10-CM

## 2024-09-17 DIAGNOSIS — R05.3 CHRONIC COUGH: Primary | ICD-10-CM

## 2024-09-17 PROCEDURE — 1126F AMNT PAIN NOTED NONE PRSNT: CPT | Performed by: FAMILY MEDICINE

## 2024-09-17 PROCEDURE — 99214 OFFICE O/P EST MOD 30 MIN: CPT | Performed by: FAMILY MEDICINE

## 2024-09-17 PROCEDURE — 90656 IIV3 VACC NO PRSV 0.5 ML IM: CPT | Performed by: FAMILY MEDICINE

## 2024-09-17 PROCEDURE — 90471 IMMUNIZATION ADMIN: CPT | Performed by: FAMILY MEDICINE

## 2024-09-17 RX ORDER — MONTELUKAST SODIUM 10 MG/1
10 TABLET ORAL NIGHTLY
Qty: 30 TABLET | Refills: 1 | Status: SHIPPED | OUTPATIENT
Start: 2024-09-17

## 2024-09-17 RX ORDER — FAMOTIDINE 40 MG/1
40 TABLET, FILM COATED ORAL DAILY
Qty: 30 TABLET | Refills: 1 | Status: SHIPPED | OUTPATIENT
Start: 2024-09-17

## 2024-11-19 DIAGNOSIS — R05.3 CHRONIC COUGH: Primary | ICD-10-CM

## 2025-03-14 ENCOUNTER — OFFICE VISIT (OUTPATIENT)
Dept: FAMILY MEDICINE CLINIC | Facility: CLINIC | Age: 60
End: 2025-03-14
Payer: COMMERCIAL

## 2025-03-14 VITALS
HEART RATE: 86 BPM | DIASTOLIC BLOOD PRESSURE: 76 MMHG | BODY MASS INDEX: 22.31 KG/M2 | WEIGHT: 133.9 LBS | SYSTOLIC BLOOD PRESSURE: 120 MMHG | HEIGHT: 65 IN | OXYGEN SATURATION: 98 %

## 2025-03-14 DIAGNOSIS — M25.511 ACUTE PAIN OF RIGHT SHOULDER: Primary | ICD-10-CM

## 2025-03-14 RX ORDER — AZITHROMYCIN 250 MG/1
250 TABLET, FILM COATED ORAL DAILY
COMMUNITY
Start: 2025-02-10

## 2025-03-14 RX ORDER — BUDESONIDE AND FORMOTEROL FUMARATE DIHYDRATE 160; 4.5 UG/1; UG/1
2 AEROSOL RESPIRATORY (INHALATION)
COMMUNITY
Start: 2025-03-10

## 2025-03-15 NOTE — PROGRESS NOTES
Follow Up Office Visit      Date of Visit:  2025   Patient Name: Adriana Martini  : 1965   MRN: [id number]     Chief Complaint:    Chief Complaint   Patient presents with    Shoulder Pain     C/o right shoulder pain onset 3 months.        History of Present Illness: Adriana Martini is a 59 y.o. female who is here today for follow up.  Patient has complaints of right shoulder pain for 3 months duration.  No specific injury.  Difficult and using rotator cuff muscles with movements above her shoulder.  Difficult time with sleep at night.  History of Present Illness        Subjective      Review of Systems:   Review of Systems    Past Medical History: History reviewed. No pertinent past medical history.    Past Surgical History: History reviewed. No pertinent surgical history.    Family History:   Family History   Problem Relation Age of Onset    Cancer Father         lung       Social History:   Social History     Socioeconomic History    Marital status:    Tobacco Use    Smoking status: Never     Passive exposure: Never    Smokeless tobacco: Never   Vaping Use    Vaping status: Never Used   Substance and Sexual Activity    Alcohol use: Not Currently     Comment: glass of wine very rarely    Drug use: Never    Sexual activity: Defer     Partners: Male       Medications:     Current Outpatient Medications:     azithromycin (ZITHROMAX) 250 MG tablet, Take 1 tablet by mouth Daily., Disp: , Rfl:     budesonide-formoterol (SYMBICORT) 160-4.5 MCG/ACT inhaler, Inhale 2 puffs., Disp: , Rfl:     montelukast (Singulair) 10 MG tablet, Take 1 tablet by mouth Every Night., Disp: 30 tablet, Rfl: 1    famotidine (Pepcid) 40 MG tablet, Take 1 tablet by mouth Daily. (Patient not taking: Reported on 3/14/2025), Disp: 30 tablet, Rfl: 1    Allergies:   No Known Allergies    Objective     Physical Exam:  Vital Signs:   Vitals:    25 1520   BP: 120/76   Pulse: 86   SpO2: 98%   Weight: 60.7 kg (133 lb 14.4  "oz)   Height: 165.1 cm (65\")     Body mass index is 22.28 kg/m².     Physical Exam  Vitals and nursing note reviewed.   Constitutional:       General: She is not in acute distress.     Appearance: Normal appearance. She is not ill-appearing.   HENT:      Head: Normocephalic and atraumatic.      Right Ear: Tympanic membrane and ear canal normal.      Left Ear: Tympanic membrane and ear canal normal.      Nose: Nose normal.   Cardiovascular:      Rate and Rhythm: Normal rate and regular rhythm.      Heart sounds: Normal heart sounds.   Pulmonary:      Effort: Pulmonary effort is normal.      Breath sounds: Normal breath sounds.   Musculoskeletal:         General: Tenderness present.      Comments: Decreased range of motion right shoulder   Neurological:      Mental Status: She is alert and oriented to person, place, and time. Mental status is at baseline.   Psychiatric:         Mood and Affect: Mood normal.       Physical Exam      Arthrocentesis    Date/Time: 3/15/2025 9:29 AM    Performed by: Parviz Araujo MD  Authorized by: Parviz Aarujo MD  Body area: shoulder  Joint: right shoulder  Local anesthesia used: yes  Anesthesia: local infiltration    Anesthesia:  Local anesthesia used: yes  Local Anesthetic: lidocaine 1% without epinephrine  Anesthetic total: 3 mL  Preparation: Patient was prepped and draped in the usual sterile fashion.  Needle size: 22 G  Ultrasound guidance: no  Approach: anterior  Patient tolerance: patient tolerated the procedure well with no immediate complications          Results    Assessment / Plan      Assessment/Plan:   Diagnoses and all orders for this visit:    1. Acute pain of right shoulder (Primary)    Other orders  -     Arthrocentesis       Assessment & Plan      Discussed appropriate physical therapy with exercises.  Gave home exercises.  Gave shoulder injections today.    Follow Up:   No follow-ups on file.    Parviz Araujo  [facility]     Patient " or patient representative verbalized consent for the use of Ambient Listening during the visit with  Parviz Araujo MD for chart documentation. 3/15/2025  09:30 EDT

## 2025-08-14 ENCOUNTER — TELEPHONE (OUTPATIENT)
Dept: FAMILY MEDICINE CLINIC | Facility: CLINIC | Age: 60
End: 2025-08-14
Payer: COMMERCIAL

## 2025-08-29 ENCOUNTER — OFFICE VISIT (OUTPATIENT)
Dept: FAMILY MEDICINE CLINIC | Facility: CLINIC | Age: 60
End: 2025-08-29
Payer: COMMERCIAL

## 2025-08-29 VITALS
BODY MASS INDEX: 21.33 KG/M2 | HEIGHT: 65 IN | WEIGHT: 128 LBS | SYSTOLIC BLOOD PRESSURE: 90 MMHG | HEART RATE: 66 BPM | DIASTOLIC BLOOD PRESSURE: 54 MMHG | OXYGEN SATURATION: 100 %

## 2025-08-29 DIAGNOSIS — G89.29 CHRONIC RIGHT SHOULDER PAIN: Primary | ICD-10-CM

## 2025-08-29 DIAGNOSIS — R05.3 CHRONIC COUGH: ICD-10-CM

## 2025-08-29 DIAGNOSIS — M25.511 CHRONIC RIGHT SHOULDER PAIN: Primary | ICD-10-CM

## 2025-08-29 PROCEDURE — 99214 OFFICE O/P EST MOD 30 MIN: CPT | Performed by: NURSE PRACTITIONER

## 2025-08-29 PROCEDURE — 1160F RVW MEDS BY RX/DR IN RCRD: CPT | Performed by: NURSE PRACTITIONER

## 2025-08-29 PROCEDURE — 1126F AMNT PAIN NOTED NONE PRSNT: CPT | Performed by: NURSE PRACTITIONER

## 2025-08-29 PROCEDURE — 1159F MED LIST DOCD IN RCRD: CPT | Performed by: NURSE PRACTITIONER

## 2025-08-29 RX ORDER — OMEPRAZOLE 40 MG/1
40 CAPSULE, DELAYED RELEASE ORAL DAILY
Qty: 90 CAPSULE | Refills: 3 | Status: SHIPPED | OUTPATIENT
Start: 2025-08-29